# Patient Record
(demographics unavailable — no encounter records)

---

## 2024-12-02 NOTE — ASSESSMENT
[FreeTextEntry1] : Patient is having some flexor tendinitis from the hardware.  Options were discussed ranging from conservative management to surgical removal of the hardware.  Risk associated with each option including refracture and complications with removal of hardware as well as maintaining the hardware were discussed which included tendon injury.  All questions answered.  She elected conservative management but if the pain from the hardware does not resolve she will consider removal of the hardware (skeletal dynamics plate) with scar revision.  If symptoms resolve she can follow on an as-needed basis.

## 2024-12-02 NOTE — PHYSICAL EXAM
[de-identified] : Scar is well-healed there is no evidence of infection but there is tenderness over the hardware of the left distal radius.  No tenderness over the TFCC or radial ulnar joint no evidence of instability radial joint or carpus.  Flexion/extension is 80/60 on the right 70/50 on the left Pronation/supination is 80/80 on the right and 80/70 on the left   strength 40 pounds on the right 30 pounds on the left Right hand dominant  Flexor and extensor tendons to the digits intact with active equaling passive range of motion  There is good capillary refill of the digits bilaterally.There is no masses or sensitivity over the median and ulnar nerves at the level of the wrist. There is a negative Tinel's and negative Phalen's sign bilaterally. The sensation is grossly intact bilaterally. [de-identified] : PA lateral and oblique shows no evidence of arthritis of the radiocarpal midcarpal joint there is solid union of the previous radius and remodeling of the ulna.

## 2024-12-02 NOTE — HISTORY OF PRESENT ILLNESS
[Right] : right hand dominant [FreeTextEntry1] : Date of surgery: June 25, 2024  Patient is just over 5 months status post open reduction and internal fixation of left distal radius fracture and closed reduction of left distal ulna shaft fracture.  Patient has done with formal therapy and is doing her own home program.